# Patient Record
Sex: FEMALE | Race: WHITE
[De-identification: names, ages, dates, MRNs, and addresses within clinical notes are randomized per-mention and may not be internally consistent; named-entity substitution may affect disease eponyms.]

---

## 2017-11-01 ENCOUNTER — HOSPITAL ENCOUNTER (OUTPATIENT)
Dept: HOSPITAL 72 - GAS | Age: 52
Discharge: HOME | End: 2017-11-01
Payer: COMMERCIAL

## 2017-11-01 VITALS — SYSTOLIC BLOOD PRESSURE: 108 MMHG | DIASTOLIC BLOOD PRESSURE: 74 MMHG

## 2017-11-01 VITALS — SYSTOLIC BLOOD PRESSURE: 106 MMHG | DIASTOLIC BLOOD PRESSURE: 70 MMHG

## 2017-11-01 VITALS — SYSTOLIC BLOOD PRESSURE: 99 MMHG | DIASTOLIC BLOOD PRESSURE: 64 MMHG

## 2017-11-01 VITALS — HEIGHT: 65 IN | WEIGHT: 162 LBS | BODY MASS INDEX: 26.99 KG/M2

## 2017-11-01 VITALS — SYSTOLIC BLOOD PRESSURE: 105 MMHG | DIASTOLIC BLOOD PRESSURE: 80 MMHG

## 2017-11-01 VITALS — SYSTOLIC BLOOD PRESSURE: 110 MMHG | DIASTOLIC BLOOD PRESSURE: 68 MMHG

## 2017-11-01 VITALS — DIASTOLIC BLOOD PRESSURE: 68 MMHG | SYSTOLIC BLOOD PRESSURE: 103 MMHG

## 2017-11-01 VITALS — SYSTOLIC BLOOD PRESSURE: 101 MMHG | DIASTOLIC BLOOD PRESSURE: 66 MMHG

## 2017-11-01 VITALS — SYSTOLIC BLOOD PRESSURE: 116 MMHG | DIASTOLIC BLOOD PRESSURE: 61 MMHG

## 2017-11-01 DIAGNOSIS — K64.8: ICD-10-CM

## 2017-11-01 DIAGNOSIS — Z12.11: Primary | ICD-10-CM

## 2017-11-01 DIAGNOSIS — K63.5: ICD-10-CM

## 2017-11-01 DIAGNOSIS — Z90.89: ICD-10-CM

## 2017-11-01 DIAGNOSIS — F32.9: ICD-10-CM

## 2017-11-01 DIAGNOSIS — F41.9: ICD-10-CM

## 2017-11-01 PROCEDURE — 94003 VENT MGMT INPAT SUBQ DAY: CPT

## 2017-11-01 PROCEDURE — 93005 ELECTROCARDIOGRAM TRACING: CPT

## 2017-11-01 PROCEDURE — 94150 VITAL CAPACITY TEST: CPT

## 2017-11-01 PROCEDURE — 45380 COLONOSCOPY AND BIOPSY: CPT

## 2017-11-01 NOTE — PRE-PROCEDURE NOTE/ATTESTATION
Pre-Procedure Note/Attestation


Complete Prior to Procedure


Planned Procedure:  not applicable


Procedure Narrative:


colonoscopy





Indications for Procedure


Pre-Operative Diagnosis:


screening





Attestation


I attest that I discussed the nature of the procedure; its benefits; risks and 

complications; and alternatives (and the risks and benefits of such alternatives

), prior to the procedure, with the patient (or the patient's legal 

representative).





I attest that, if there was a reasonable possibility of needing a blood 

transfusion, the patient (or the patient's legal representative) was given the 

[facility] standardized written summary, pursuant 

to the John Melany Blood Safety Act ([place] Health and Safety Code # 1645, as 

amended).





I attest that I re-evaluated the patient just prior to the surgery and that 

there has been no change in the patient's H&P, except as documented below:











REENA CLAY Nov 1, 2017 08:33

## 2017-11-01 NOTE — PRE-PROCEDURE NOTE/ATTESTATION
Pre-Procedure Note/Attestation


Complete Prior to Procedure


Planned Procedure:  not applicable


Procedure Narrative:


colonoscopy





Indications for Procedure


Pre-Operative Diagnosis:


screening





Attestation


I attest that I discussed the nature of the procedure; its benefits; risks and 

complications; and alternatives (and the risks and benefits of such alternatives

), prior to the procedure, with the patient (or the patient's legal 

representative).





I attest that, if there was a reasonable possibility of needing a blood 

transfusion, the patient (or the patient's legal representative) was given the 

[facility] standardized written summary, pursuant 

to the John Melany Blood Safety Act ([place] Health and Safety Code # 1645, as 

amended).





I attest that I re-evaluated the patient just prior to the surgery and that 

there has been no change in the patient's H&P, except as documented below:











REENA CLAY Nov 1, 2017 08:33

## 2017-11-01 NOTE — SHORT STAY SURGERY H&P
History of Present Illness


History of Present Illness


Chief Complaint


screening colon


HPI


Jaya Rizvi is a 52 year old female who was admitted on  for Colon 

Screening





Patient History


Allergies:  


Coded Allergies:  


     No Known Allergies (Unverified , 11/1/17)


PAST MEDICAL HISTORY:  


(1) History of appendectomy


Past Surgeries:  


Social History:  





Medication History


Miscellaneous Medications


Bupropion Hcl (Wellbutrin Sr), 200 MG ORAL, (Reported)





Review of Systems


Cardiovascular:  Reports: no symptoms


Respiratory:  Reports: no symptoms


Skeletal:  Reports: no symptoms


Gastrointestinal:  Reports: no symptoms


Genitourinary:  Reports: no symptoms


Neurologic:  Reports: no symptoms


Endocrine:  Reports: no symptoms


Hematologic:  Reports: no symptoms





Physical Exam


Vital Signs





Last Vital Signs








  Date Time  Temp Pulse Resp B/P (MAP) Pulse Ox O2 Delivery O2 Flow Rate FiO2


 


11/1/17 07:35 98.0 70 18 101/66 96 Room Air  








Skin:  normal


HENT:  normal


Heart:  normal


Lungs:  normal


Abdomen:  normal


Extremities:  normal





Plan


Plan of Care


colonocopy


Final Diagnosis:  


Attestation


Are the patient's medical conditions optimized for surgery?


Attestation Response:  yes











REENA CLAY Nov 1, 2017 08:34

## 2017-11-01 NOTE — 48 HOUR POST ANESTHESIA EVAL
Post Anesthesia Evaluation


Procedure:  colonoscopy


Date of Evaluation:  Nov 1, 2017


Time of Evaluation:  09:19


Blood Pressure Systolic:  105


0:  80


Pulse Rate:  68


Respiratory Rate:  18


Temperature (Fahrenheit):  97.7


O2 Sat by Pulse Oximetry:  99


Airway:  patent


Nausea:  No


Vomiting:  No


Pain Intensity:  0


Hydration Status:  adequate


Cardiopulmonary Status:


stable


Mental Status/LOC:  patient returned to baseline


Post-Anesthesia Complications:


none


Follow-up care needed:  N/A











ITA GRAY Nov 1, 2017 10:59

## 2017-11-01 NOTE — ANETHESIA PREOPERATIVE EVAL
Anesthesia Pre-op PMH/ROS


General


Date of Evaluation:  Nov 1, 2017


Time of Evaluation:  06:59


Anesthesiologist:  karen


ASA Score:  ASA 2


Mallampati Score


Class I : Soft palate, uvula, fauces, pillars visible


Class II: Soft palate, uvula, fauces visible


Class III: Soft palate, base of uvula visible


Class IV: Only hard plate visible


Mallampati Classification:  Class II


Surgeon:  fuad


Diagnosis:  colon screening


Surgical Procedure:  colonoscopy


Anesthesia History:  none


Social History:  smoking - nonsmoker


Family History:  no anesthesia problems


Allergies:  


Coded Allergies:  


     No Known Allergies (Unverified , 11/1/17)


Medications:  see eMAR





Past Medical History


Neurologic/Psychiatric:  Reports: depression/anxiety





Anesthesia Pre-op Phys. Exam


Physician Exam


Constitutional:  NAD


Neurologic:  CN 2-12 intact


Cardiovascular:  RRR


Respiratory:  CTA


Gastrointestinal:  S/NT/ND





Airway Exam


Mallampati Score:  Class II


MO:  full


Neck:  supple


TMD:  2fb


ROM:  full


Teeth:  intact





Anesthesia Pre-op A/P


Studies


Pre-op Studies:  EKG - nsr, anterior infarct age undetermined





Risk Assessment & Plan


Assessment:


asa2


Plan:


mac


Status Change Before Surgery:  No





Pre-Antibiotics


Drug:  ITA Mantilla Nov 1, 2017 07:00

## 2017-11-01 NOTE — PRE-PROCEDURE NOTE/ATTESTATION
Pre-Procedure Note/Attestation


Complete Prior to Procedure


Planned Procedure:  not applicable


Procedure Narrative:


colonoscopy





Indications for Procedure


Pre-Operative Diagnosis:


screening





Attestation


I attest that I discussed the nature of the procedure; its benefits; risks and 

complications; and alternatives (and the risks and benefits of such alternatives

), prior to the procedure, with the patient (or the patient's legal 

representative).





I attest that, if there was a reasonable possibility of needing a blood 

transfusion, the patient (or the patient's legal representative) was given the 

[facility] standardized written summary, pursuant 

to the John Melany Blood Safety Act ([place] Health and Safety Code # 1645, as 

amended).





I attest that I re-evaluated the patient just prior to the surgery and that 

there has been no change in the patient's H&P, except as documented below:











REENA CLAY Nov 1, 2017 08:33

## 2017-11-01 NOTE — PROCEDURE NOTE
DATE OF PROCEDURE:  11/01/2017



SURGEON:  Cheko Zhang M.D.



PROCEDURE:  Colonoscopy with biopsy.



ANESTHESIOLOGIST:  Alise Landrum M.D.



INSTRUMENT:  Olympus adult flexible colonoscope.



INDICATION:  Screening colonoscopy.



REASON FOR PROCEDURE:  The procedure, risks, benefits, and possible

consequences, including hemorrhage, aspiration, perforation and infection,

and alternative treatments, were explained to the patient/legal guardian

by Dr. Cheko Zhang and the patient/legal guardian understood and

accepted these risks.



PROCEDURE:  After informed consent was obtained and the patient was

adequately sedated, first rectal exam was performed, which was positive

for internal hemorrhoids.  Then, the scope was advanced from the rectum

into the cecum documented by appendiceal orifice, ileocecal valve, and

upper quadrant palpation.  Quality of prep was very good.



The patient had one small polyp in the rectosigmoid area, removed with

the cold biopsy forceps technique.  The rest of the examination was

grossly within normal limits.  Retroflexion of rectum showed evidence of

medium-sized internal hemorrhoids.



SUMMARY FINDINGS:

1. One colonic polyp removed, see above for details.

2. Internal hemorrhoids.



RECOMMENDATIONS:

1. Follow up biopsy results and treat accordingly.

2. Recommend repeat colonoscopy in five years.



I want to thank, Dr. Rizvi for this kind referral.









  ______________________________________________

  Cheko Zhang M.D.





DR:  SAMY

D:  11/01/2017 09:06

T:  11/01/2017 15:44

JOB#:  [id number]

CC:  Shivani Rizvi M.D.; Fax#:  [phone]

## 2017-11-01 NOTE — PROCEDURE NOTE
DATE OF PROCEDURE:  11/01/2017



SURGEON:  Cheko Zhang M.D.



PROCEDURE:  Colonoscopy with biopsy.



ANESTHESIOLOGIST:  Alise Landrmu M.D.



INSTRUMENT:  Olympus adult flexible colonoscope.



INDICATION:  Screening colonoscopy.



REASON FOR PROCEDURE:  The procedure, risks, benefits, and possible

consequences, including hemorrhage, aspiration, perforation and infection,

and alternative treatments, were explained to the patient/legal guardian

by Dr. Cheko Zhang and the patient/legal guardian understood and

accepted these risks.



PROCEDURE:  After informed consent was obtained and the patient was

adequately sedated, first rectal exam was performed, which was positive

for internal hemorrhoids.  Then, the scope was advanced from the rectum

into the cecum documented by appendiceal orifice, ileocecal valve, and

upper quadrant palpation.  Quality of prep was very good.



The patient had one small polyp in the rectosigmoid area, removed with

the cold biopsy forceps technique.  The rest of the examination was

grossly within normal limits.  Retroflexion of rectum showed evidence of

medium-sized internal hemorrhoids.



SUMMARY FINDINGS:

1. One colonic polyp removed, see above for details.

2. Internal hemorrhoids.



RECOMMENDATIONS:

1. Follow up biopsy results and treat accordingly.

2. Recommend repeat colonoscopy in five years.



I want to thank, Dr. Rizvi for this kind referral.









  ______________________________________________

  Cheko Zhang M.D.





DR:  SAMY

D:  11/01/2017 09:06

T:  11/01/2017 15:44

JOB#:  [id number]

CC:  Shivani Rizvi M.D.; Fax#:  [phone]

## 2017-11-01 NOTE — PROCEDURE NOTE
DATE OF PROCEDURE:  11/01/2017



SURGEON:  Cheko Zhang M.D.



PROCEDURE:  Colonoscopy with biopsy.



ANESTHESIOLOGIST:  Alise Landrum M.D.



INSTRUMENT:  Olympus adult flexible colonoscope.



INDICATION:  Screening colonoscopy.



REASON FOR PROCEDURE:  The procedure, risks, benefits, and possible

consequences, including hemorrhage, aspiration, perforation and infection,

and alternative treatments, were explained to the patient/legal guardian

by Dr. Cheko Zhagn and the patient/legal guardian understood and

accepted these risks.



PROCEDURE:  After informed consent was obtained and the patient was

adequately sedated, first rectal exam was performed, which was positive

for internal hemorrhoids.  Then, the scope was advanced from the rectum

into the cecum documented by appendiceal orifice, ileocecal valve, and

upper quadrant palpation.  Quality of prep was very good.



The patient had one small polyp in the rectosigmoid area, removed with

the cold biopsy forceps technique.  The rest of the examination was

grossly within normal limits.  Retroflexion of rectum showed evidence of

medium-sized internal hemorrhoids.



SUMMARY FINDINGS:

1. One colonic polyp removed, see above for details.

2. Internal hemorrhoids.



RECOMMENDATIONS:

1. Follow up biopsy results and treat accordingly.

2. Recommend repeat colonoscopy in five years.



I want to thank, Dr. Rizvi for this kind referral.









  ______________________________________________

  Cheko Zhang M.D.





DR:  SAMY

D:  11/01/2017 09:06

T:  11/01/2017 15:44

JOB#:  [id number]

CC:  Shivani Rizvi M.D.; Fax#:  [phone]

## 2017-11-01 NOTE — IMMEDIATE POST-OP EVALUATION
Immediate Post-Op Evalulation


Immediate Post-Op Evalulation


Procedure:  colonoscopy


Date of Evaluation:  Nov 1, 2017


Time of Evaluation:  09:17


IV Fluids:  600ml 0.9ns


Blood Products:  none


Estimated Blood Loss:  negligible


Blood Pressure Systolic:  106


Blood Pressure Diastolic:  78


Pulse Rate:  68


Respiratory Rate:  18


O2 Sat by Pulse Oximetry:  99


Temperature (Fahrenheit):  97.7


Pain Score (1-10):  0


Nausea:  No


Vomiting:  No


Complications


none


Patient Status:  awake, reacts, patent


Hydration Status:  adequate


Drug:  ITA Mantilla Nov 1, 2017 10:58

## 2017-11-01 NOTE — ENDOSCOPY PROCEDURE NOTE
Endoscopy Procedure Note


Indication for Procedure:  screening colon


Procedures Performed:  colonoscopy


Operative Findings/Diagnosis:  one polyp


Specimen:  yes


Pt Tolerated Procedure Well:  Yes


Estimated Blood Loss:  none


Anesthesiologist:  hudson


Anesthesia:  MAC


Implant(s) used?:  No


50 yrs or older w/o bx or poly:  No


10yrs. F/U not recommended:  Yes


If not recommended, why?:  Above average risk


10 yrs. F/U needed:  Yes


18 years or older w/prev. colo:  No











REENA CLAY Nov 1, 2017 09:08

## 2017-11-03 NOTE — CARDIOLOGY REPORT
--------------- APPROVED REPORT --------------





EKG Measurement

Heart [id number]

HI 196P74

[id number]

[id number]

[id number]





Normal sinus rhythm

Anterior infarct, age undetermined

Abnormal ECG

## 2017-11-03 NOTE — CARDIOLOGY REPORT
--------------- APPROVED REPORT --------------





EKG Measurement

Heart [id number]

SC 196P74

[id number]

[id number]

[id number]





Normal sinus rhythm

Anterior infarct, age undetermined

Abnormal ECG

## 2017-11-03 NOTE — CARDIOLOGY REPORT
--------------- APPROVED REPORT --------------





EKG Measurement

Heart [id number]

ME 196P74

[id number]

[id number]

[id number]





Normal sinus rhythm

Anterior infarct, age undetermined

Abnormal ECG

## 2018-02-02 ENCOUNTER — HOSPITAL ENCOUNTER (EMERGENCY)
Dept: HOSPITAL 72 - EMR | Age: 53
Discharge: HOME | End: 2018-02-02
Payer: COMMERCIAL

## 2018-02-02 VITALS — DIASTOLIC BLOOD PRESSURE: 75 MMHG | SYSTOLIC BLOOD PRESSURE: 110 MMHG

## 2018-02-02 VITALS — HEIGHT: 65 IN | WEIGHT: 165 LBS | BODY MASS INDEX: 27.49 KG/M2

## 2018-02-02 VITALS — SYSTOLIC BLOOD PRESSURE: 110 MMHG | DIASTOLIC BLOOD PRESSURE: 75 MMHG

## 2018-02-02 DIAGNOSIS — F41.9: ICD-10-CM

## 2018-02-02 DIAGNOSIS — R10.13: Primary | ICD-10-CM

## 2018-02-02 LAB
ADD MANUAL DIFF: NO
ALBUMIN SERPL-MCNC: 3.6 G/DL (ref 3.4–5)
ALBUMIN/GLOB SERPL: 0.9 {RATIO} (ref 1–2.7)
ALP SERPL-CCNC: 58 U/L (ref 46–116)
ALT SERPL-CCNC: 27 U/L (ref 12–78)
ANION GAP SERPL CALC-SCNC: 8 MMOL/L (ref 5–15)
APPEARANCE UR: CLEAR
APTT PPP: (no result) S
AST SERPL-CCNC: 19 U/L (ref 15–37)
BASOPHILS NFR BLD AUTO: 1 % (ref 0–2)
BILIRUB SERPL-MCNC: 0.2 MG/DL (ref 0.2–1)
BUN SERPL-MCNC: 17 MG/DL (ref 7–18)
CALCIUM SERPL-MCNC: 8.6 MG/DL (ref 8.5–10.1)
CHLORIDE SERPL-SCNC: 104 MMOL/L (ref 98–107)
CO2 SERPL-SCNC: 29 MMOL/L (ref 21–32)
CREAT SERPL-MCNC: 0.9 MG/DL (ref 0.55–1.3)
EOSINOPHIL NFR BLD AUTO: 1.7 % (ref 0–3)
ERYTHROCYTE [DISTWIDTH] IN BLOOD BY AUTOMATED COUNT: 11.8 % (ref 11.6–14.8)
GLOBULIN SER-MCNC: 4.1 G/DL
GLUCOSE UR STRIP-MCNC: NEGATIVE MG/DL
HCT VFR BLD CALC: 38.5 % (ref 37–47)
HGB BLD-MCNC: 13.1 G/DL (ref 12–16)
KETONES UR QL STRIP: NEGATIVE
LEUKOCYTE ESTERASE UR QL STRIP: (no result)
LYMPHOCYTES NFR BLD AUTO: 44.4 % (ref 20–45)
MCV RBC AUTO: 87 FL (ref 80–99)
MONOCYTES NFR BLD AUTO: 5.6 % (ref 1–10)
NEUTROPHILS NFR BLD AUTO: 47.3 % (ref 45–75)
NITRITE UR QL STRIP: NEGATIVE
PH UR STRIP: 5 [PH] (ref 4.5–8)
PLATELET # BLD: 176 K/UL (ref 150–450)
POTASSIUM SERPL-SCNC: 3.8 MMOL/L (ref 3.5–5.1)
PROT UR QL STRIP: NEGATIVE
RBC # BLD AUTO: 4.44 M/UL (ref 4.2–5.4)
SODIUM SERPL-SCNC: 140 MMOL/L (ref 136–145)
SP GR UR STRIP: 1.02 (ref 1–1.03)
UROBILINOGEN UR-MCNC: NORMAL MG/DL (ref 0–1)
WBC # BLD AUTO: 5.3 K/UL (ref 4.8–10.8)

## 2018-02-02 PROCEDURE — 96374 THER/PROPH/DIAG INJ IV PUSH: CPT

## 2018-02-02 PROCEDURE — 76705 ECHO EXAM OF ABDOMEN: CPT

## 2018-02-02 PROCEDURE — 80053 COMPREHEN METABOLIC PANEL: CPT

## 2018-02-02 PROCEDURE — 93005 ELECTROCARDIOGRAM TRACING: CPT

## 2018-02-02 PROCEDURE — 81003 URINALYSIS AUTO W/O SCOPE: CPT

## 2018-02-02 PROCEDURE — 96375 TX/PRO/DX INJ NEW DRUG ADDON: CPT

## 2018-02-02 PROCEDURE — 85025 COMPLETE CBC W/AUTO DIFF WBC: CPT

## 2018-02-02 PROCEDURE — 36415 COLL VENOUS BLD VENIPUNCTURE: CPT

## 2018-02-02 PROCEDURE — 83690 ASSAY OF LIPASE: CPT

## 2018-02-02 PROCEDURE — 99284 EMERGENCY DEPT VISIT MOD MDM: CPT

## 2018-02-02 NOTE — EMERGENCY ROOM REPORT
History of Present Illness


General


Chief Complaint:  Abdominal Pain


Source:  Patient





Present Illness


HPI


52-year-old female with no sig pmhx p/w epigastric abd pain for 2-3 days. 


Patient states pain started gradually , localized to epigastric area, non 

radiating, burning in nature, intermittent. No relieving or exacerbating 

factors. Admits to have this pain multiple times in the past, but states this 

time it did not go away with medication. denies chronic NSAID use. 


Pt reports n/v, 2 episodes of nbnb vomiting, no diarrhea, last bowel movement 

was today. Denies black or bloody stools. 


Denies fever, chills. 


No hx of abdominal surgeries.


No hx of endoscopies


Allergies:  


Coded Allergies:  


     No Known Allergies (Unverified , 11/1/17)





Patient History


Past Medical History:  see triage record


Past Surgical History:  none


Pertinent Family History:  none


Pregnant Now:  No


Reviewed Nursing Documentation:  PMH: Agreed, PSxH: Agreed





Nursing Documentation-PMH


Past Medical History:  No History, Except For


Hx Cardiac Problems:  No


Hx Cancer:  No


Hx Gastrointestinal Problems:  Yes


History Of Psychiatric Problem:  Yes - anxiety


Hx Neurological Problems:  No





Review of Systems


All Other Systems:  negative except mentioned in HPI





Physical Exam





Vital Signs








  Date Time  Temp Pulse Resp B/P (MAP) Pulse Ox O2 Delivery O2 Flow Rate FiO2


 


2/2/18 20:13 97.5 69 16 111/76 97 Room Air  








Sp02 EP Interpretation:  reviewed, normal


General Appearance:  normal inspection, well appearing, no apparent distress, 

alert, GCS 15, non-toxic


Head:  normocephalic, atraumatic


Eyes:  bilateral eye normal inspection, bilateral eye PERRL, bilateral eye EOMI


ENT:  normal ENT inspection, normal pharynx, normal voice, moist mucus membranes


Neck:  normal inspection, full range of motion, supple


Respiratory:  normal inspection, lungs clear, normal breath sounds, no 

respiratory distress, no retraction, no wheezing, speaking full sentences, 

chest symmetrical


Cardiovascular #1:  normal inspection, regular rate, rhythm, no edema, normal 

capillary refill


Cardiovascular #2:  2+ radial (R), 2+ radial (L)


Gastrointestinal:  other - Mild epigastric tenderness, no bradycardia or 

quadrant tenderness, abdomen is very soft, mild distention, no guarding no 

rigidity,normal bowel sounds


Musculoskeletal:  normal inspection, back normal, normal range of motion, non-

tender


Neurologic:  normal inspection, alert, oriented x3, responsive, motor strength/

tone normal, sensory intact, normal gait, speech normal


Psychiatric:  normal inspection, judgement/insight normal, memory normal


Skin:  normal inspection, normal color, no rash, warm/dry, well hydrated, 

normal turgor





Medical Decision Making


Diagnostic Impression:  


 Primary Impression:  


 Epigastric abdominal pain


ER Course


52-year-old female with epigastric abdominal pain 





Differential Diagnosis:


Gastritis, gastroenteritis, cholecystitis, appendicitis, diverticulitis,UTI/

pyelo


At this time abdomen is soft nontender a site from epigastric region, not 

likely to have acute intra-abdominal surgical pathology, will hold CT for now. 





Plan:


Basic labs, ua, ekg


Pepcid, maalox, pain control, IVF


RUQ sono





ER course:


Patient has remained stable during ED stay.


Pain improved. 


Repeat abdominal exam is nontender.


RUQ sono neg





Disposition:


Patient is to be discharged to home.


Prescriptions given are Pepcid


Patient is instructed to follow up with their primary care doctor within 5 

days. 


Patient is instructed to follow up with GI doctor within 3 days.


Strict return precautions discussed with patient such as fever, chills, 

worsening/severe abdominal pain, nausea, vomiting, black or bloody stools, 

which may indicate severe illness. Patient verbalizes understanding and agrees 

with plan. 





Please note that this Emergency Department Report was dictated using [employer] technology software, occasionally this can lead to 

erroneous entry secondary to interpretation by the dictation equipment








EKG Diagnostic Results


EP Interpretation: Yes


Rate: normal


Rhythm: NSR


ST Segments: No acute changes 


ASA given to patient: No











Rhythm Strip


EP Interpretation: Yes


Rate: 80


Rhythm: NSR, no PVCs, no ectopy





Laboratory Tests








Test


  2/2/18


20:25 2/2/18


20:30


 


Urine Color Pale yellow   


 


Urine Appearance Clear   


 


Urine pH 5 (4.5-8.0)   


 


Urine Specific Gravity


  1.020


(1.005-1.035) 


 


 


Urine Protein


  Negative


(NEGATIVE) 


 


 


Urine Glucose (UA)


  Negative


(NEGATIVE) 


 


 


Urine Ketones


  Negative


(NEGATIVE) 


 


 


Urine Occult Blood


  1+ (NEGATIVE)


H 


 


 


Urine Nitrite


  Negative


(NEGATIVE) 


 


 


Urine Bilirubin


  Negative


(NEGATIVE) 


 


 


Urine Urobilinogen


  Normal MG/DL


(0.0-1.0) 


 


 


Urine Leukocyte Esterase


  1+ (NEGATIVE)


H 


 


 


Urine RBC


  2-4 /HPF (0 -


2)  H 


 


 


Urine WBC


  2-4 /HPF (0 -


2) 


 


 


Urine Squamous Epithelial


Cells Many /LPF


(NONE/OCC)  H 


 


 


Urine Bacteria


  Few /HPF


(NONE) 


 


 


White Blood Count


  


  5.3 K/UL


(4.8-10.8)


 


Red Blood Count


  


  4.44 M/UL


(4.20-5.40)


 


Hemoglobin


  


  13.1 G/DL


(12.0-16.0)


 


Hematocrit


  


  38.5 %


(37.0-47.0)


 


Mean Corpuscular Volume  87 FL (80-99)  


 


Mean Corpuscular Hemoglobin


  


  29.6 PG


(27.0-31.0)


 


Mean Corpuscular Hemoglobin


Concent 


  34.1 G/DL


(32.0-36.0)


 


Red Cell Distribution Width


  


  11.8 %


(11.6-14.8)


 


Platelet Count


  


  176 K/UL


(150-450)


 


Mean Platelet Volume


  


  7.9 FL


(6.5-10.1)


 


Neutrophils (%) (Auto)


  


  47.3 %


(45.0-75.0)


 


Lymphocytes (%) (Auto)


  


  44.4 %


(20.0-45.0)


 


Monocytes (%) (Auto)


  


  5.6 %


(1.0-10.0)


 


Eosinophils (%) (Auto)


  


  1.7 %


(0.0-3.0)


 


Basophils (%) (Auto)


  


  1.0 %


(0.0-2.0)


 


Sodium Level


  


  140 MMOL/L


(136-145)


 


Potassium Level


  


  3.8 MMOL/L


(3.5-5.1)


 


Chloride Level


  


  104 MMOL/L


()


 


Carbon Dioxide Level


  


  29 MMOL/L


(21-32)


 


Anion Gap


  


  8 mmol/L


(5-15)


 


Blood Urea Nitrogen


  


  17 mg/dL


(7-18)


 


Creatinine


  


  0.9 MG/DL


(0.55-1.30)


 


Estimate Glomerular


Filtration Rate 


  > 60 mL/min


(>60)


 


Glucose Level


  


  91 MG/DL


()


 


Calcium Level


  


  8.6 MG/DL


(8.5-10.1)


 


Total Bilirubin


  


  0.2 MG/DL


(0.2-1.0)


 


Aspartate Amino Transferase


(AST) 


  19 U/L (15-37)


 


 


Alanine Aminotransferase (ALT)


  


  27 U/L (12-78)


 


 


Alkaline Phosphatase


  


  58 U/L


()


 


Total Protein


  


  7.7 G/DL


(6.4-8.2)


 


Albumin


  


  3.6 G/DL


(3.4-5.0)


 


Globulin  4.1 g/dL  


 


Albumin/Globulin Ratio


  


  0.9 (1.0-2.7)


L


 


Lipase


  


  112 U/L


()








CT/MRI/US Diagnostic Results


CT/MRI/US Diagnostic Results :  


   Imaging Test Ordered:  gallbladder sono


   Impression


negative





Last Vital Signs








  Date Time  Temp Pulse Resp B/P (MAP) Pulse Ox O2 Delivery O2 Flow Rate FiO2


 


2/2/18 20:13 97.5 69 16 111/76 97 Room Air  








Disposition:  HOME, SELF-CARE


Condition:  Improved


Scripts


Simethicone (GAS-X) 62.5 Mg Strip


62.5 MG PO DAILY, #7 STRIP 0 Refills


   Prov: Clay Santos M.D.         2/2/18 


Famotidine (PEPCID) 40 Mg Tablet


40 MG PO DAILY, #7 TAB 0 Refills


   Prov: Clay Santos M.D.         2/2/18


Patient Instructions:  Abdominal Pain, Adult, Gastritis, Adult











Clay Santos M.D. Feb 2, 2018 21:10

## 2018-02-03 NOTE — DIAGNOSTIC IMAGING REPORT
Indication: Abdominal pain

 

Technique: Limited grayscale and duplex images of the right upper quadrant and

midline structures

 

Comparison: none

 

Findings: Normal pancreas. The liver is unremarkable, demonstrates normal

echogenicity, no focal abnormality. Normal portal and hepatic venous Doppler flow.

The gallbladder is nondistended, otherwise unremarkable, no stones or wall

thickening. Sonographic Martínez's sign reported as negative. Common bile duct measures

3 mm diameter. No intrahepatic ductal dilatation. The right kidney measures 12.8 cm

in length. Nonaneurysmal abdominal aorta

 

Impression: Somewhat limited exam, as described. Essentially unremarkable. Negative

for gallstones or dilated ducts

## 2018-02-03 NOTE — CARDIOLOGY REPORT
--------------- APPROVED REPORT --------------





EKG Measurement

Heart [id number]

NC 190P67

[id number]

[id number]

[id number]





Normal sinus rhythm

Low voltage QRS

Cannot rule out Anterior infarct, age undetermined

Abnormal ECG